# Patient Record
Sex: FEMALE | Race: OTHER | NOT HISPANIC OR LATINO | URBAN - METROPOLITAN AREA
[De-identification: names, ages, dates, MRNs, and addresses within clinical notes are randomized per-mention and may not be internally consistent; named-entity substitution may affect disease eponyms.]

---

## 2024-06-17 ENCOUNTER — EMERGENCY (EMERGENCY)
Facility: HOSPITAL | Age: 49
LOS: 1 days | Discharge: HOME | End: 2024-06-17
Attending: EMERGENCY MEDICINE
Payer: COMMERCIAL

## 2024-06-17 VITALS
TEMPERATURE: 98 F | WEIGHT: 110.89 LBS | HEART RATE: 91 BPM | SYSTOLIC BLOOD PRESSURE: 134 MMHG | OXYGEN SATURATION: 100 % | DIASTOLIC BLOOD PRESSURE: 86 MMHG | RESPIRATION RATE: 18 BRPM | HEIGHT: 62 IN

## 2024-06-17 VITALS
OXYGEN SATURATION: 99 % | RESPIRATION RATE: 18 BRPM | DIASTOLIC BLOOD PRESSURE: 75 MMHG | HEART RATE: 73 BPM | SYSTOLIC BLOOD PRESSURE: 128 MMHG | TEMPERATURE: 98 F

## 2024-06-17 LAB
ACETONE SERPL-MCNC: NEGATIVE — SIGNIFICANT CHANGE UP
ALBUMIN SERPL ELPH-MCNC: 4 G/DL — SIGNIFICANT CHANGE UP (ref 3.5–5)
ALP SERPL-CCNC: 72 U/L — SIGNIFICANT CHANGE UP (ref 40–120)
ALT FLD-CCNC: 19 U/L DA — SIGNIFICANT CHANGE UP (ref 10–60)
ANION GAP SERPL CALC-SCNC: 5 MMOL/L — SIGNIFICANT CHANGE UP (ref 5–17)
AST SERPL-CCNC: 12 U/L — SIGNIFICANT CHANGE UP (ref 10–40)
BASOPHILS # BLD AUTO: 0.02 K/UL — SIGNIFICANT CHANGE UP (ref 0–0.2)
BASOPHILS NFR BLD AUTO: 0.2 % — SIGNIFICANT CHANGE UP (ref 0–2)
BILIRUB SERPL-MCNC: 0.5 MG/DL — SIGNIFICANT CHANGE UP (ref 0.2–1.2)
BUN SERPL-MCNC: 15 MG/DL — SIGNIFICANT CHANGE UP (ref 7–18)
CALCIUM SERPL-MCNC: 9 MG/DL — SIGNIFICANT CHANGE UP (ref 8.4–10.5)
CHLORIDE SERPL-SCNC: 109 MMOL/L — HIGH (ref 96–108)
CK SERPL-CCNC: 82 U/L — SIGNIFICANT CHANGE UP (ref 21–215)
CO2 SERPL-SCNC: 26 MMOL/L — SIGNIFICANT CHANGE UP (ref 22–31)
CREAT SERPL-MCNC: 0.67 MG/DL — SIGNIFICANT CHANGE UP (ref 0.5–1.3)
EGFR: 106 ML/MIN/1.73M2 — SIGNIFICANT CHANGE UP
EOSINOPHIL # BLD AUTO: 0.02 K/UL — SIGNIFICANT CHANGE UP (ref 0–0.5)
EOSINOPHIL NFR BLD AUTO: 0.2 % — SIGNIFICANT CHANGE UP (ref 0–6)
GLUCOSE SERPL-MCNC: 103 MG/DL — HIGH (ref 70–99)
HCG SERPL-ACNC: <1 MIU/ML — SIGNIFICANT CHANGE UP
HCT VFR BLD CALC: 38.4 % — SIGNIFICANT CHANGE UP (ref 34.5–45)
HGB BLD-MCNC: 12.7 G/DL — SIGNIFICANT CHANGE UP (ref 11.5–15.5)
IMM GRANULOCYTES NFR BLD AUTO: 0.2 % — SIGNIFICANT CHANGE UP (ref 0–0.9)
LYMPHOCYTES # BLD AUTO: 1.43 K/UL — SIGNIFICANT CHANGE UP (ref 1–3.3)
LYMPHOCYTES # BLD AUTO: 16.3 % — SIGNIFICANT CHANGE UP (ref 13–44)
MAGNESIUM SERPL-MCNC: 2.1 MG/DL — SIGNIFICANT CHANGE UP (ref 1.6–2.6)
MCHC RBC-ENTMCNC: 27.9 PG — SIGNIFICANT CHANGE UP (ref 27–34)
MCHC RBC-ENTMCNC: 33.1 GM/DL — SIGNIFICANT CHANGE UP (ref 32–36)
MCV RBC AUTO: 84.2 FL — SIGNIFICANT CHANGE UP (ref 80–100)
MONOCYTES # BLD AUTO: 0.6 K/UL — SIGNIFICANT CHANGE UP (ref 0–0.9)
MONOCYTES NFR BLD AUTO: 6.8 % — SIGNIFICANT CHANGE UP (ref 2–14)
NEUTROPHILS # BLD AUTO: 6.68 K/UL — SIGNIFICANT CHANGE UP (ref 1.8–7.4)
NEUTROPHILS NFR BLD AUTO: 76.3 % — SIGNIFICANT CHANGE UP (ref 43–77)
NRBC # BLD: 0 /100 WBCS — SIGNIFICANT CHANGE UP (ref 0–0)
PLATELET # BLD AUTO: 195 K/UL — SIGNIFICANT CHANGE UP (ref 150–400)
POTASSIUM SERPL-MCNC: 3.5 MMOL/L — SIGNIFICANT CHANGE UP (ref 3.5–5.3)
POTASSIUM SERPL-SCNC: 3.5 MMOL/L — SIGNIFICANT CHANGE UP (ref 3.5–5.3)
PROT SERPL-MCNC: 7.2 G/DL — SIGNIFICANT CHANGE UP (ref 6–8.3)
RBC # BLD: 4.56 M/UL — SIGNIFICANT CHANGE UP (ref 3.8–5.2)
RBC # FLD: 14.6 % — HIGH (ref 10.3–14.5)
SODIUM SERPL-SCNC: 140 MMOL/L — SIGNIFICANT CHANGE UP (ref 135–145)
TROPONIN I, HIGH SENSITIVITY RESULT: 3.5 NG/L — SIGNIFICANT CHANGE UP
WBC # BLD: 8.77 K/UL — SIGNIFICANT CHANGE UP (ref 3.8–10.5)
WBC # FLD AUTO: 8.77 K/UL — SIGNIFICANT CHANGE UP (ref 3.8–10.5)

## 2024-06-17 PROCEDURE — 83735 ASSAY OF MAGNESIUM: CPT

## 2024-06-17 PROCEDURE — 85025 COMPLETE CBC W/AUTO DIFF WBC: CPT

## 2024-06-17 PROCEDURE — 99284 EMERGENCY DEPT VISIT MOD MDM: CPT

## 2024-06-17 PROCEDURE — 80053 COMPREHEN METABOLIC PANEL: CPT

## 2024-06-17 PROCEDURE — 82962 GLUCOSE BLOOD TEST: CPT

## 2024-06-17 PROCEDURE — 84702 CHORIONIC GONADOTROPIN TEST: CPT

## 2024-06-17 PROCEDURE — 82009 KETONE BODYS QUAL: CPT

## 2024-06-17 PROCEDURE — 36415 COLL VENOUS BLD VENIPUNCTURE: CPT

## 2024-06-17 PROCEDURE — 84484 ASSAY OF TROPONIN QUANT: CPT

## 2024-06-17 PROCEDURE — 82550 ASSAY OF CK (CPK): CPT

## 2024-06-17 PROCEDURE — 99283 EMERGENCY DEPT VISIT LOW MDM: CPT

## 2024-06-17 RX ORDER — SODIUM CHLORIDE 9 MG/ML
1000 INJECTION INTRAMUSCULAR; INTRAVENOUS; SUBCUTANEOUS ONCE
Refills: 0 | Status: COMPLETED | OUTPATIENT
Start: 2024-06-17 | End: 2024-06-17

## 2024-06-17 RX ADMIN — SODIUM CHLORIDE 1000 MILLILITER(S): 9 INJECTION INTRAMUSCULAR; INTRAVENOUS; SUBCUTANEOUS at 18:58

## 2024-06-17 NOTE — ED PROVIDER NOTE - NSFOLLOWUPINSTRUCTIONS_ED_ALL_ED_FT
Heat Exhaustion  Heat exhaustion happens when the body gets overheated from hot weather, exercise, strenuous physical activity, dehydration, or a combination of these. It is important to treat heat exhaustion as soon as possible. If untreated, heat exhaustion can lead to heat stroke, which is a medical emergency and can be life-threatening.    What are the causes?  Common causes of heat exhaustion include:  Exercising or doing strenuous labor or activity in hot or humid weather.  Being exposed to very warm and poorly ventilated environments, such as living in a home without air conditioning or being in a car without good ventilation.  Not drinking enough water, especially in hot or humid weather.  Not having enough salts and minerals in the blood (electrolytes).  What increases the risk?  The following factors may make you more likely to develop this condition:  Exercising beyond your fitness level in hot conditions.  Exercising or working in hot weather when your body is not used to the heat.  Wearing clothing that does not allow your sweat to evaporate.  Drinking a lot of alcoholic beverages or beverages that have caffeine. This can lead to dehydration.  Being age 65 or older.  Being a child.  Having certain chronic medical conditions, such as heart disease, poor circulation or other vascular diseases, sickle cell disease, high blood pressure, diabetes, lung disease, or cystic fibrosis.  Being very overweight (obese).  Taking certain medicines, such as those for high blood pressure, antihistamines, or tranquilizers.  Using drugs such as cocaine, heroin, or amphetamines.  What are the signs or symptoms?  Symptoms of this condition include:  Heavy sweating along with feeling weak, dizzy, light-headed, and nauseous.  Vomiting.  Red, blotchy rash over the body. This is also called prickly heat.  Rapid heartbeat.  Headache.  Body temperature over 102.2ºF (39ºC).  Urine that is darker than normal.  Muscle cramps, such as in the leg or side (flank).  Moist, cool, and clammy skin.  Fatigue.  Thirst.  Difficulty focusing or concentrating.  Passing out.  Signs and symptoms may develop suddenly or over time.    How is this diagnosed?  This condition may be diagnosed based on your symptoms, a physical exam, and history of heat exposure.    How is this treated?  This condition may be treated by:  Immediately stopping physical activity.  Moving to a cooler, shaded area with good ventilation and airflow, or to an air-conditioned environment.  Removing all unnecessary clothing to expose as much skin to the air as possible.  Using cooling fans and water-misting sprays to cool the body down.  Drinking plenty of fluids, including sports drinks with electrolytes.  Having cooling blankets placed on you to lower your body temperature.  Giving you fluids through an IV in a hospital.  Follow these instructions at home:    If you think that you have heat exhaustion:  Ask a friend or a family member to stay with you.  Take a cool bath or shower.  Drink enough fluid to keep your urine pale yellow.  Lie down and rest.  Return to your normal activities as told by your health care provider. Ask your health care provider what activities are safe for you.  Contact a health care provider if:  Symptoms last for more than 30 minutes.  You feel your symptoms are not improving after taking steps to cool down.  Get help right away if:  You have any symptoms of heat stroke. These include:  Temperature of 104°F (40°C) or higher.  Diffusely red skin.  Inability to sweat, resulting in hot, dry skin.  Excessive thirst.  Nausea and vomiting.  Rapid breathing.  Chest pain.  Headache.  Confusion or disorientation.  Fainting.  Seizure.  These symptoms may represent a serious problem that is an emergency. Do not wait to see if the symptoms will go away. Get medical help right away. Call your local emergency services (911 in the [place] Do not drive yourself to the hospital.    Summary  Heat exhaustion happens when your body gets overheated from hot weather, strenuous activity, and dehydration.  Symptoms include sweating, fatigue, muscle cramps, and headache.  Treat heat exhaustion immediately by moving to a ventilated and cool environment, having cool water sprayed on as much of the skin as possible, removing all unnecessary clothing, and drinking fluids with electrolytes.  If your symptoms last for more than 30 minutes, contact a health care provider.  If untreated, heat exhaustion can lead to heat stroke, which is a medical emergency and can be life-threatening.  This information is not intended to replace advice given to you by your health care provider. Make sure you discuss any questions you have with your health care provider.      Drink plenty of fluids   follow-up with your medical doctor

## 2024-06-17 NOTE — ED PROVIDER NOTE - PATIENT PORTAL LINK FT
You can access the FollowMyHealth Patient Portal offered by [facility] by registering at the following website: [email or web address]. By joining [employer]’s FollowMyHealth portal, you will also be able to view your health information using other applications (apps) compatible with our system.

## 2024-06-17 NOTE — ED ADULT TRIAGE NOTE - CHIEF COMPLAINT QUOTE
Patient c/o generalized weakness x 45 minutes ago while in a cab. Patient reports that the cab was very hot and she begged the  to put on the air conditioner and the  refused. Denies chest pain, no SOB.

## 2024-06-17 NOTE — ED PROVIDER NOTE - OBJECTIVE STATEMENT
49-year-old female with no PMH, LMP yesterday B IBA, patient claims she was sitting in the back of a taxi cab, there was no AC or ventilation in the cab & was very hot in there.  Patient suddenly developed cramping and tingling sensation to her hands, call light appearance with her fingers.  Patient request to sit in the front of the Taxicab.  Patient felt better but not fully recovered, and again with cramping and tingling sensation to her hand, dizziness.   then call 911 to bring patient to ED.  Patient endorses it is very hot In the taxicab

## 2024-06-17 NOTE — ED PROVIDER NOTE - CLINICAL SUMMARY MEDICAL DECISION MAKING FREE TEXT BOX
49-year-old female with cramping and tingling sensation to her hands while sitting in a poorly ventilated, hot taxicab.  Patient mostly with heat exhaustion,  panic attack, also concern for electrolyte imbalance.  Will get labs, give IV fluids and reassess

## 2024-06-17 NOTE — ED ADULT NURSE NOTE - OBJECTIVE STATEMENT
patient reports feeling numbness and tingle and patient fingers stiffened, and mouth was not moving.